# Patient Record
Sex: FEMALE | Race: WHITE | NOT HISPANIC OR LATINO | Employment: FULL TIME | URBAN - METROPOLITAN AREA
[De-identification: names, ages, dates, MRNs, and addresses within clinical notes are randomized per-mention and may not be internally consistent; named-entity substitution may affect disease eponyms.]

---

## 2023-09-05 PROBLEM — E55.9 VITAMIN D DEFICIENCY: Status: ACTIVE | Noted: 2023-09-05

## 2023-09-05 PROBLEM — F41.9 ANXIETY: Status: ACTIVE | Noted: 2023-09-05

## 2023-09-05 PROBLEM — Z86.39 HISTORY OF HYPERGLYCEMIA: Status: ACTIVE | Noted: 2023-09-05

## 2023-09-05 PROBLEM — E11.9 TYPE 2 DIABETES MELLITUS (MULTI): Status: ACTIVE | Noted: 2023-09-05

## 2023-09-05 PROBLEM — E78.5 HYPERLIPIDEMIA: Status: ACTIVE | Noted: 2023-09-05

## 2023-09-05 PROBLEM — E05.90 HYPERTHYROIDISM: Status: ACTIVE | Noted: 2023-09-05

## 2023-09-05 RX ORDER — ZINC GLUCONATE 100 MG
1 TABLET ORAL DAILY
COMMUNITY

## 2023-09-05 RX ORDER — ASCORBIC ACID 500 MG
1 TABLET ORAL DAILY
COMMUNITY

## 2023-09-05 RX ORDER — MULTIVITAMIN/IRON/FOLIC ACID 18MG-0.4MG
TABLET ORAL
COMMUNITY

## 2023-09-05 RX ORDER — ACETAMINOPHEN 500 MG
1 TABLET ORAL DAILY
COMMUNITY

## 2023-12-28 ENCOUNTER — APPOINTMENT (OUTPATIENT)
Dept: PRIMARY CARE | Facility: CLINIC | Age: 48
End: 2023-12-28
Payer: COMMERCIAL

## 2024-03-08 ENCOUNTER — LAB (OUTPATIENT)
Dept: LAB | Facility: LAB | Age: 49
End: 2024-03-08
Payer: COMMERCIAL

## 2024-03-08 DIAGNOSIS — E05.90 THYROTOXICOSIS, UNSPECIFIED WITHOUT THYROTOXIC CRISIS OR STORM: Primary | ICD-10-CM

## 2024-03-08 DIAGNOSIS — E78.5 HYPERLIPIDEMIA, UNSPECIFIED: ICD-10-CM

## 2024-03-08 DIAGNOSIS — E11.9 TYPE 2 DIABETES MELLITUS WITHOUT COMPLICATIONS (MULTI): ICD-10-CM

## 2024-03-08 LAB
ALBUMIN SERPL-MCNC: 4.1 G/DL (ref 3.5–5)
ALP BLD-CCNC: 77 U/L (ref 35–125)
ALT SERPL-CCNC: 34 U/L (ref 5–40)
ANION GAP SERPL CALC-SCNC: 18 MMOL/L
AST SERPL-CCNC: 33 U/L (ref 5–40)
BILIRUB SERPL-MCNC: 0.5 MG/DL (ref 0.1–1.2)
BUN SERPL-MCNC: 12 MG/DL (ref 8–25)
CALCIUM SERPL-MCNC: 9.6 MG/DL (ref 8.5–10.4)
CHLORIDE SERPL-SCNC: 101 MMOL/L (ref 97–107)
CHOLEST SERPL-MCNC: 170 MG/DL (ref 133–200)
CHOLEST/HDLC SERPL: 2.5 {RATIO}
CO2 SERPL-SCNC: 21 MMOL/L (ref 24–31)
CREAT SERPL-MCNC: 0.5 MG/DL (ref 0.4–1.6)
EGFRCR SERPLBLD CKD-EPI 2021: >90 ML/MIN/1.73M*2
EST. AVERAGE GLUCOSE BLD GHB EST-MCNC: 183 MG/DL
GLUCOSE SERPL-MCNC: 98 MG/DL (ref 65–99)
HBA1C MFR BLD: 8 %
HDLC SERPL-MCNC: 68 MG/DL
LDLC SERPL CALC-MCNC: 90 MG/DL (ref 65–130)
POTASSIUM SERPL-SCNC: 4.3 MMOL/L (ref 3.4–5.1)
PROT SERPL-MCNC: 6.7 G/DL (ref 5.9–7.9)
SODIUM SERPL-SCNC: 140 MMOL/L (ref 133–145)
T4 FREE SERPL-MCNC: 3.5 NG/DL (ref 0.9–1.7)
TRIGL SERPL-MCNC: 60 MG/DL (ref 40–150)
TSH SERPL DL<=0.05 MIU/L-ACNC: <0.01 MIU/L (ref 0.27–4.2)

## 2024-03-08 PROCEDURE — 83036 HEMOGLOBIN GLYCOSYLATED A1C: CPT

## 2024-03-08 PROCEDURE — 36415 COLL VENOUS BLD VENIPUNCTURE: CPT

## 2024-03-08 PROCEDURE — 84439 ASSAY OF FREE THYROXINE: CPT

## 2024-03-08 PROCEDURE — 80061 LIPID PANEL: CPT

## 2024-03-08 PROCEDURE — 84443 ASSAY THYROID STIM HORMONE: CPT

## 2024-03-08 PROCEDURE — 80053 COMPREHEN METABOLIC PANEL: CPT

## 2024-03-13 ENCOUNTER — OFFICE VISIT (OUTPATIENT)
Dept: PRIMARY CARE | Facility: CLINIC | Age: 49
End: 2024-03-13
Payer: COMMERCIAL

## 2024-03-13 VITALS
DIASTOLIC BLOOD PRESSURE: 74 MMHG | OXYGEN SATURATION: 99 % | SYSTOLIC BLOOD PRESSURE: 126 MMHG | TEMPERATURE: 97.3 F | HEART RATE: 77 BPM | HEIGHT: 60 IN | BODY MASS INDEX: 28.27 KG/M2 | WEIGHT: 144 LBS

## 2024-03-13 DIAGNOSIS — Z01.89 ENCOUNTER FOR ROUTINE LABORATORY TESTING: ICD-10-CM

## 2024-03-13 DIAGNOSIS — L30.9 ECZEMA OF BOTH HANDS: ICD-10-CM

## 2024-03-13 DIAGNOSIS — E05.90 HYPERTHYROIDISM: ICD-10-CM

## 2024-03-13 DIAGNOSIS — Z11.59 NEED FOR HEPATITIS C SCREENING TEST: ICD-10-CM

## 2024-03-13 DIAGNOSIS — Z12.31 ENCOUNTER FOR SCREENING MAMMOGRAM FOR BREAST CANCER: ICD-10-CM

## 2024-03-13 DIAGNOSIS — Z00.00 ANNUAL PHYSICAL EXAM: Primary | ICD-10-CM

## 2024-03-13 DIAGNOSIS — E55.9 VITAMIN D DEFICIENCY: ICD-10-CM

## 2024-03-13 DIAGNOSIS — E11.9 TYPE 2 DIABETES MELLITUS WITHOUT COMPLICATION, WITHOUT LONG-TERM CURRENT USE OF INSULIN (MULTI): ICD-10-CM

## 2024-03-13 PROCEDURE — 3074F SYST BP LT 130 MM HG: CPT | Performed by: NURSE PRACTITIONER

## 2024-03-13 PROCEDURE — 3052F HG A1C>EQUAL 8.0%<EQUAL 9.0%: CPT | Performed by: NURSE PRACTITIONER

## 2024-03-13 PROCEDURE — 1036F TOBACCO NON-USER: CPT | Performed by: NURSE PRACTITIONER

## 2024-03-13 PROCEDURE — 99396 PREV VISIT EST AGE 40-64: CPT | Performed by: NURSE PRACTITIONER

## 2024-03-13 PROCEDURE — 3048F LDL-C <100 MG/DL: CPT | Performed by: NURSE PRACTITIONER

## 2024-03-13 PROCEDURE — 3078F DIAST BP <80 MM HG: CPT | Performed by: NURSE PRACTITIONER

## 2024-03-13 RX ORDER — METFORMIN HYDROCHLORIDE 500 MG/1
500 TABLET ORAL
Qty: 180 TABLET | Refills: 3 | Status: SHIPPED | OUTPATIENT
Start: 2024-03-13

## 2024-03-13 RX ORDER — BLOOD-GLUCOSE SENSOR
EACH MISCELLANEOUS
Qty: 2 EACH | Refills: 11 | Status: SHIPPED | OUTPATIENT
Start: 2024-03-13

## 2024-03-13 RX ORDER — TRIAMCINOLONE ACETONIDE 1 MG/G
OINTMENT TOPICAL 2 TIMES DAILY PRN
Qty: 15 G | Refills: 1 | Status: SHIPPED | OUTPATIENT
Start: 2024-03-13 | End: 2024-07-11

## 2024-03-13 ASSESSMENT — LIFESTYLE VARIABLES
HOW OFTEN DURING THE LAST YEAR HAVE YOU FAILED TO DO WHAT WAS NORMALLY EXPECTED FROM YOU BECAUSE OF DRINKING: NEVER
HOW MANY STANDARD DRINKS CONTAINING ALCOHOL DO YOU HAVE ON A TYPICAL DAY: PATIENT DOES NOT DRINK
HAS A RELATIVE, FRIEND, DOCTOR, OR ANOTHER HEALTH PROFESSIONAL EXPRESSED CONCERN ABOUT YOUR DRINKING OR SUGGESTED YOU CUT DOWN: NO
HOW OFTEN DURING THE LAST YEAR HAVE YOU BEEN UNABLE TO REMEMBER WHAT HAPPENED THE NIGHT BEFORE BECAUSE YOU HAD BEEN DRINKING: NEVER
AUDIT-C TOTAL SCORE: 0
HOW OFTEN DURING THE LAST YEAR HAVE YOU HAD A FEELING OF GUILT OR REMORSE AFTER DRINKING: NEVER
HOW OFTEN DO YOU HAVE A DRINK CONTAINING ALCOHOL: NEVER
HAVE YOU OR SOMEONE ELSE BEEN INJURED AS A RESULT OF YOUR DRINKING: NO
HOW OFTEN DURING THE LAST YEAR HAVE YOU FOUND THAT YOU WERE NOT ABLE TO STOP DRINKING ONCE YOU HAD STARTED: NEVER
AUDIT TOTAL SCORE: 0
SKIP TO QUESTIONS 9-10: 1
HOW OFTEN DO YOU HAVE SIX OR MORE DRINKS ON ONE OCCASION: NEVER
HOW OFTEN DURING THE LAST YEAR HAVE YOU NEEDED AN ALCOHOLIC DRINK FIRST THING IN THE MORNING TO GET YOURSELF GOING AFTER A NIGHT OF HEAVY DRINKING: NEVER

## 2024-03-13 ASSESSMENT — ENCOUNTER SYMPTOMS
POLYPHAGIA: 0
BACK PAIN: 0
NAUSEA: 0
HEMATURIA: 0
OCCASIONAL FEELINGS OF UNSTEADINESS: 0
FREQUENCY: 0
POLYDIPSIA: 0
ADENOPATHY: 0
BRUISES/BLEEDS EASILY: 0
SPEECH DIFFICULTY: 0
FEVER: 0
BLOOD IN STOOL: 0
PALPITATIONS: 0
AGITATION: 0
FATIGUE: 0
APPETITE CHANGE: 0
CHILLS: 0
SEIZURES: 0
LOSS OF SENSATION IN FEET: 0
WOUND: 0
ABDOMINAL PAIN: 0
CHEST TIGHTNESS: 0
FACIAL ASYMMETRY: 0
CONFUSION: 0
COUGH: 0
DEPRESSION: 0
VOMITING: 0
DIZZINESS: 0
DYSURIA: 0
DIAPHORESIS: 0
HEADACHES: 0
SHORTNESS OF BREATH: 0
NECK PAIN: 0

## 2024-03-13 ASSESSMENT — PAIN SCALES - GENERAL: PAINLEVEL: 0-NO PAIN

## 2024-03-13 ASSESSMENT — PROMIS GLOBAL HEALTH SCALE
RATE_MENTAL_HEALTH: VERY GOOD
CARRYOUT_PHYSICAL_ACTIVITIES: COMPLETELY
RATE_SOCIAL_SATISFACTION: VERY GOOD
RATE_AVERAGE_PAIN: 0
RATE_PHYSICAL_HEALTH: VERY GOOD
RATE_QUALITY_OF_LIFE: VERY GOOD
RATE_GENERAL_HEALTH: VERY GOOD
CARRYOUT_SOCIAL_ACTIVITIES: VERY GOOD
EMOTIONAL_PROBLEMS: RARELY

## 2024-03-13 ASSESSMENT — PATIENT HEALTH QUESTIONNAIRE - PHQ9
SUM OF ALL RESPONSES TO PHQ9 QUESTIONS 1 AND 2: 0
1. LITTLE INTEREST OR PLEASURE IN DOING THINGS: NOT AT ALL
2. FEELING DOWN, DEPRESSED OR HOPELESS: NOT AT ALL

## 2024-03-13 NOTE — PROGRESS NOTES
[facility]: [facility]  PHYSICAL EXAM      Josi Nicole is a 49 y.o. female that is presenting today for CPE. Newly diagnosed DMII.  Complaint of very dry skin to fingertips on both hands, cracking and bleeding.    Referred to Endocrinology for Hyperthyroidism.    Assessment/Plan   Diagnoses and all orders for this visit:    Annual physical exam    Vitamin D deficiency        -     Vitamin D; Future        -     Continue daily OTC Vitamin D supplement    Hyperthyroidism  -     Referral to Endocrinology; Future    Need for hepatitis C screening test  -     Hepatitis C antibody; Future    Encounter for screening mammogram for breast cancer  -     BI mammo bilateral screening tomosynthesis; Future    Type 2 diabetes mellitus without complication, without long-term current use of insulin (CMS/HCC)  -     Newly diagnosed, start meds and obtain annual labs  -     Start metFORMIN (Glucophage) 500 mg tablet; Take 1 tablet (500 mg) by mouth 2 times a day with meals.  -     Referral to diabetic educator  -     Continue FreeStyle Olivia 3 Sensor device; Apply every 2 weeks  -     Albumin , Urine Random; Future  -     Hemoglobin A1C; Future  -     Basic Metabolic Panel; Future  -     Follow up in 3 months to assess status    Eczema of both hands  -     Start triamcinolone (Kenalog) 0.1 % ointment; Apply topically 2 times a day as needed for irritation or rash.    Encounter for routine laboratory testing  -     Albumin , Urine Random; Future  -     Hemoglobin A1C; Future  -     Basic Metabolic Panel; Future    Other orders  -     Follow Up In Primary Care - Established; Future    Subjective   Subjective  Josi Nicole is an 49 y.o. female who presents for follow up of diabetes. Current symptoms include: none. Patient denies foot ulcerations, hyperglycemia, hypoglycemia , increased appetite, nausea, paresthesia of the feet, polydipsia, polyuria, visual disturbances, vomiting, and weight loss.  Evaluation to date has included: fasting blood sugar, fasting lipid panel, and hemoglobin A1C. Home sugars: BGs are running  consistent with Hgb A1C. Current treatments: none. Last dilated eye exam - not done    [unfilled]     Objective  [unfilled]     Laboratory:  Lab Results       Component                Value               Date                       HGBA1C                   8.0 (H)             03/08/2024              Assessment/Plan  Diabetes mellitus Type II, under poor control.  Addressed ADA diet.  Encouraged aerobic exercise.  Discussed foot care.  Reminded to get yearly retinal exam.  Started metformin; see  medication orders.  Diabetes educator referral.  Endocrinology clinic referral.  Follow up in 3 months or as needed.        Review of Systems   Constitutional:  Negative for appetite change, chills, diaphoresis, fatigue and fever.   HENT:  Negative for hearing loss and mouth sores.    Eyes:  Negative for visual disturbance.   Respiratory:  Negative for cough, chest tightness and shortness of breath.    Cardiovascular:  Negative for chest pain, palpitations and leg swelling.   Gastrointestinal:  Negative for abdominal pain, blood in stool, nausea and vomiting.   Endocrine: Negative for cold intolerance, heat intolerance, polydipsia, polyphagia and polyuria.   Genitourinary:  Negative for dysuria, frequency and hematuria.   Musculoskeletal:  Negative for back pain and neck pain.   Skin:  Positive for rash (bilateral fingers - dry, cracking, bleeding). Negative for wound.   Allergic/Immunologic: Negative for environmental allergies, food allergies and immunocompromised state.   Neurological:  Negative for dizziness, seizures, syncope, facial asymmetry, speech difficulty and headaches.   Hematological:  Negative for adenopathy. Does not bruise/bleed easily.   Psychiatric/Behavioral:  Negative for agitation and confusion.       Objective   Vitals:    03/13/24 0945   BP: 126/74   Pulse: 77   Temp: 36.3 °C  (97.3 °F)   SpO2: 99%     Body mass index is 28.12 kg/m².  Physical Exam  Vitals and nursing note reviewed.   Constitutional:       General: She is not in acute distress.     Appearance: Normal appearance. She is not ill-appearing.   HENT:      Head: Normocephalic and atraumatic.      Right Ear: Tympanic membrane, ear canal and external ear normal. There is no impacted cerumen.      Left Ear: Tympanic membrane, ear canal and external ear normal. There is no impacted cerumen.      Nose: Nose normal.      Mouth/Throat:      Mouth: Mucous membranes are moist.      Pharynx: Oropharynx is clear. No oropharyngeal exudate or posterior oropharyngeal erythema.   Eyes:      General: No scleral icterus.        Right eye: No discharge.         Left eye: No discharge.      Extraocular Movements: Extraocular movements intact.      Conjunctiva/sclera: Conjunctivae normal.      Pupils: Pupils are equal, round, and reactive to light.   Neck:      Vascular: No carotid bruit.   Cardiovascular:      Rate and Rhythm: Normal rate and regular rhythm.      Pulses: Normal pulses.           Dorsalis pedis pulses are 2+ on the right side and 2+ on the left side.      Heart sounds: Normal heart sounds. No murmur heard.  Pulmonary:      Effort: Pulmonary effort is normal. No respiratory distress.      Breath sounds: Normal breath sounds.   Abdominal:      General: Abdomen is flat. Bowel sounds are normal. There is no distension.      Palpations: Abdomen is soft. There is no mass.      Tenderness: There is no abdominal tenderness. There is no right CVA tenderness or left CVA tenderness.      Hernia: No hernia is present.   Musculoskeletal:         General: No tenderness. Normal range of motion.      Cervical back: No tenderness.      Right lower leg: No edema.      Left lower leg: No edema.      Right foot: Normal range of motion. No deformity, bunion, Charcot foot, foot drop or prominent metatarsal heads.      Left foot: Normal range of motion.  "No deformity, bunion, Charcot foot, foot drop or prominent metatarsal heads.   Feet:      Right foot:      Protective Sensation: 7 sites tested.  7 sites sensed.      Skin integrity: Skin integrity normal.      Toenail Condition: Right toenails are normal.      Left foot:      Protective Sensation: 7 sites tested.  7 sites sensed.      Skin integrity: Skin integrity normal.      Toenail Condition: Left toenails are normal.      Comments: Normal monofilament bilaterally  Lymphadenopathy:      Cervical: No cervical adenopathy.   Skin:     General: Skin is warm and dry.      Coloration: Skin is not jaundiced.      Findings: Rash (dry, cracking skin to tips of fingers on bilateral hands without signs of bacterial infection) present.   Neurological:      General: No focal deficit present.      Mental Status: She is alert and oriented to person, place, and time. Mental status is at baseline.   Psychiatric:         Mood and Affect: Mood normal.         Behavior: Behavior normal.       Diagnostic Results   Lab Results   Component Value Date    GLUCOSE 98 03/08/2024    CALCIUM 9.6 03/08/2024     03/08/2024    K 4.3 03/08/2024    CO2 21 (L) 03/08/2024     03/08/2024    BUN 12 03/08/2024    CREATININE 0.50 03/08/2024     Lab Results   Component Value Date    ALT 34 03/08/2024    AST 33 03/08/2024    ALKPHOS 77 03/08/2024    BILITOT 0.5 03/08/2024     Lab Results   Component Value Date    WBC 6.8 03/08/2023    HGB 13.6 03/08/2023    HCT 41.1 03/08/2023    MCV 89.0 03/08/2023     03/08/2023     Lab Results   Component Value Date    CHOL 170 03/08/2024    CHOL 172 03/08/2023     Lab Results   Component Value Date    HDL 68.0 03/08/2024    HDL 64 03/08/2023     Lab Results   Component Value Date    LDLCALC 90 03/08/2024    LDLCALC 98 03/08/2023     Lab Results   Component Value Date    TRIG 60 03/08/2024    TRIG 51 03/08/2023     No components found for: \"CHOLHDL\"  Lab Results   Component Value Date    HGBA1C " 8.0 (H) 03/08/2024     Other labs not included in the list above were reviewed either before or during this encounter.    History   Past Medical History:   Diagnosis Date    Diabetes mellitus (CMS/HCC)      History reviewed. No pertinent surgical history.  Family History   Problem Relation Name Age of Onset    Hypertension Father Dad     Parkinsonism Father Dad     Stroke Father Dad      Social History     Socioeconomic History    Marital status:      Spouse name: Not on file    Number of children: Not on file    Years of education: Not on file    Highest education level: Not on file   Occupational History    Not on file   Tobacco Use    Smoking status: Never    Smokeless tobacco: Never   Substance and Sexual Activity    Alcohol use: Not Currently     Alcohol/week: 1.0 standard drink of alcohol     Types: 1 Standard drinks or equivalent per week    Drug use: Never    Sexual activity: Yes     Partners: Male     Birth control/protection: None   Other Topics Concern    Not on file   Social History Narrative    Not on file     Social Determinants of Health     Financial Resource Strain: Not on file   Food Insecurity: Not on file   Transportation Needs: Not on file   Physical Activity: Not on file   Stress: Not on file   Social Connections: Not on file   Intimate Partner Violence: Not on file   Housing Stability: Not on file     No Known Allergies  Current Outpatient Medications on File Prior to Visit   Medication Sig Dispense Refill    ascorbic acid (Vitamin C) 500 mg tablet Take 1 tablet (500 mg) by mouth once daily.      b complex 0.4 mg tablet Take by mouth.      blood sugar diagnostic strip once daily.      cholecalciferol (Vitamin D3) 50 mcg (2,000 unit) capsule Take 1 capsule (50 mcg) by mouth early in the morning..      L. acidophilus/Bifid. animalis (DAILY PROBIOTIC ORAL) Take by mouth.      multivit-min/iron/folic acid/K (ADULTS MULTIVITAMIN ORAL) Take 1 tablet by mouth once daily.      zinc gluconate  100 mg tablet Take 1 tablet (100 mg) by mouth once daily.       No current facility-administered medications on file prior to visit.     Immunization History   Administered Date(s) Administered    Moderna SARS-CoV-2 Vaccination 08/09/2021, 09/13/2021     Patient's medical history was reviewed and updated either before or during this encounter.       Asha Centeno, APRN-CNP

## 2024-03-15 ENCOUNTER — HOSPITAL ENCOUNTER (OUTPATIENT)
Dept: RADIOLOGY | Facility: CLINIC | Age: 49
Discharge: HOME | End: 2024-03-15
Payer: COMMERCIAL

## 2024-03-15 ENCOUNTER — TELEPHONE (OUTPATIENT)
Dept: CARE COORDINATION | Facility: CLINIC | Age: 49
End: 2024-03-15
Payer: COMMERCIAL

## 2024-03-15 VITALS — BODY MASS INDEX: 27.48 KG/M2 | WEIGHT: 140 LBS | HEIGHT: 60 IN

## 2024-03-15 DIAGNOSIS — Z12.31 ENCOUNTER FOR SCREENING MAMMOGRAM FOR BREAST CANCER: ICD-10-CM

## 2024-03-15 PROCEDURE — 77067 SCR MAMMO BI INCL CAD: CPT

## 2024-03-15 NOTE — PROGRESS NOTES
Spoke with patient in regard to setting up an appointment for initial diabetes education, however, patient declined services, at this time, indicating that she had a nutrition background and felt she had a good enough understanding of the disease.

## 2024-06-11 ENCOUNTER — TELEPHONE (OUTPATIENT)
Dept: ENDOCRINOLOGY | Facility: CLINIC | Age: 49
End: 2024-06-11
Payer: COMMERCIAL

## 2024-06-11 NOTE — PROGRESS NOTES
HPI   50 yo presents as new pt for thyroid.  Pt with dm2 (3/ 2024:A1c-8%).  -had gi upset on metformin 500mg ir   -10 days of olivia 3 data 81% in range, 4%low (likely compression lows)    Thyroid:  TSH-<0.01, ft4-3.5 march 2024,   -in 2023 tsh 0.01-0.02, ft4-2-3.1 range    Sx:  -no obstructive sx  -no eye sx  -on apple watch hr 90-mid 100's  -more heat sensitive  -otherwise no other sx  -lost 35 lbs 3/2023 until summer 2023 with fasting, wt coming back        Medical History        Past Medical History:   Diagnosis Date    Diabetes mellitus (CMS/HCC)           Surgical History   History reviewed. No pertinent surgical history.     Family History          Family History   Problem Relation Name Age of Onset    Hypertension Father Dad      Parkinsonism Father Dad      Stroke Father Dad         maternal aunt/gm-dm2  Mom-ifg    Social History       -tobacco, - alcohol       Current Outpatient Medications:     ascorbic acid (Vitamin C) 500 mg tablet, Take 1 tablet (500 mg) by mouth once daily., Disp: , Rfl:     b complex 0.4 mg tablet, Take by mouth., Disp: , Rfl:     cholecalciferol (Vitamin D3) 50 mcg (2,000 unit) capsule, Take 1 capsule (50 mcg) by mouth early in the morning.., Disp: , Rfl:     FreeStyle Olivia 3 Sensor device, Apply every 2 weeks, Disp: 2 each, Rfl: 11    L. acidophilus/Bifid. animalis (DAILY PROBIOTIC ORAL), Take by mouth., Disp: , Rfl:     multivit-min/iron/folic acid/K (ADULTS MULTIVITAMIN ORAL), Take 1 tablet by mouth once daily., Disp: , Rfl:     zinc gluconate 100 mg tablet, Take 1 tablet (100 mg) by mouth once daily., Disp: , Rfl:     metFORMIN (Glucophage) 500 mg tablet, Take 1 tablet (500 mg) by mouth 2 times a day with meals. (Patient not taking: Reported on 6/12/2024), Disp: 180 tablet, Rfl: 3    semaglutide (Ozempic) 0.25 mg or 0.5 mg (2 mg/3 mL) pen injector, Inject 0.5 mg under the skin every 7 days., Disp: 3 mL, Rfl: 5    triamcinolone (Kenalog) 0.1 % ointment, Apply topically 2 times a  "day as needed for irritation or rash. (Patient not taking: Reported on 6/12/2024), Disp: 15 g, Rfl: 1      Allergies as of 06/12/2024    (No Known Allergies)         Review of Systems   Cardiology: Lightheadedness-denies.  Chest pain-denies.  Leg edema-denies.  Palpitations-occ.  Respiratory: Cough-denies. Shortness of breath-denies.  Wheezing-denies.  Gastroenterology: Constipation-denies.  Diarrhea-denies.  Heartburn-denies.  Endocrinology: Cold intolerance-denies.  Heat intolerance +.  Sweats-denies.  Neurology: Headache-denies.  Tremor-denies.  Neuropathy in extremities-denies.  Psychology: Low energy-denies.  Irritability-denies.  Sleep disturbances-denies.      /62   Pulse 90   Ht 1.524 m (5')   Wt 67.2 kg (148 lb 3.2 oz)   BMI 28.94 kg/m²       Labs:  Lab Results   Component Value Date    WBC 6.8 03/08/2023    NRBC 0 03/08/2023    RBC 4.62 03/08/2023    HGB 13.6 03/08/2023    HCT 41.1 03/08/2023     03/08/2023     Lab Results   Component Value Date    CALCIUM 9.6 03/08/2024    AST 33 03/08/2024    ALKPHOS 77 03/08/2024    BILITOT 0.5 03/08/2024    PROT 6.7 03/08/2024    ALBUMIN 4.1 03/08/2024    GLOB 2.5 03/08/2023    AGR 1.7 03/08/2023     03/08/2024    K 4.3 03/08/2024     03/08/2024    CO2 21 (L) 03/08/2024    ANIONGAP 18 03/08/2024    BUN 12 03/08/2024    CREATININE 0.50 03/08/2024    UREACREAUR 32.5 (H) 06/16/2023    GLUCOSE 98 03/08/2024    ALT 34 03/08/2024    EGFR >90 03/08/2024     Lab Results   Component Value Date    CHOL 170 03/08/2024    TRIG 60 03/08/2024    HDL 68.0 03/08/2024    LDLCALC 90 03/08/2024     No results found for: \"MICROALBCREA\"  Lab Results   Component Value Date    TSH <0.01 (L) 03/08/2024     No results found for: \"WANTIICG63\"  Lab Results   Component Value Date    HGBA1C 8.0 (H) 03/08/2024         Physical Exam   General Appearance: pleasant, cooperative, no acute distress  HEENT: no chemosis, no proptosis, no lid lag, no lid retraction  Neck: no " lymphadenopathy, thyroid about 2X normal size  Heart: no murmurs, regular rate and rhythm, S1 and S2  Lungs: no wheezes, no rhonci, no rales  Extremities: no lower extremity swelling      Assessment/Plan   1. Hyperthyroidism  -suspect Graves' disease based on thyroid exam and duration of thyroid abnormalities on lab review  -check full labs/ab levels    -went over natural hx of Graves and discussed methimazole/I 131/surgery  -pt prefers methimazole, reviewed risk/benefits/warnings of this med  -can send rx and repeat lab orders once we have initial labs back    2. Type 2 diabetes mellitus without complication, without long-term current use of insulin (Multi)  -hyperthyroid state can acutely raise blood sugars  -based on FH and previous tests consistent with mild dm2  -pt did not tolerate metformin  -pt very concerned about wt gain with the above thyroid condition    -add ozempic 0.25mg weekly for 4 weeks, then 0.5mg weekly thereafter  -reviewed risk/benefits/warnings, taught pt how to inject this    Follow Up:  Ana 3 months    -labs/tests/notes reviewed  -reviewed and counseled patient on medication monitoring and side effects

## 2024-06-12 ENCOUNTER — APPOINTMENT (OUTPATIENT)
Dept: PRIMARY CARE | Facility: CLINIC | Age: 49
End: 2024-06-12
Payer: COMMERCIAL

## 2024-06-12 ENCOUNTER — APPOINTMENT (OUTPATIENT)
Dept: ENDOCRINOLOGY | Facility: CLINIC | Age: 49
End: 2024-06-12
Payer: COMMERCIAL

## 2024-06-12 ENCOUNTER — LAB (OUTPATIENT)
Dept: LAB | Facility: LAB | Age: 49
End: 2024-06-12
Payer: COMMERCIAL

## 2024-06-12 VITALS
DIASTOLIC BLOOD PRESSURE: 62 MMHG | BODY MASS INDEX: 29.09 KG/M2 | WEIGHT: 148.2 LBS | HEIGHT: 60 IN | HEART RATE: 90 BPM | SYSTOLIC BLOOD PRESSURE: 144 MMHG

## 2024-06-12 DIAGNOSIS — E11.9 TYPE 2 DIABETES MELLITUS WITHOUT COMPLICATION, WITHOUT LONG-TERM CURRENT USE OF INSULIN (MULTI): Primary | ICD-10-CM

## 2024-06-12 DIAGNOSIS — E05.90 HYPERTHYROIDISM: ICD-10-CM

## 2024-06-12 LAB
T3FREE SERPL-MCNC: 8.5 PG/ML (ref 2.3–4.2)
T4 FREE SERPL-MCNC: 2.9 NG/DL (ref 0.9–1.7)
THYROPEROXIDASE AB SERPL-ACNC: >1000 IU/ML
TSH SERPL DL<=0.05 MIU/L-ACNC: <0.01 MIU/L (ref 0.27–4.2)

## 2024-06-12 PROCEDURE — 3078F DIAST BP <80 MM HG: CPT | Performed by: INTERNAL MEDICINE

## 2024-06-12 PROCEDURE — 84443 ASSAY THYROID STIM HORMONE: CPT

## 2024-06-12 PROCEDURE — RXMED WILLOW AMBULATORY MEDICATION CHARGE

## 2024-06-12 PROCEDURE — 84439 ASSAY OF FREE THYROXINE: CPT

## 2024-06-12 PROCEDURE — 84445 ASSAY OF TSI GLOBULIN: CPT

## 2024-06-12 PROCEDURE — 36415 COLL VENOUS BLD VENIPUNCTURE: CPT

## 2024-06-12 PROCEDURE — 84481 FREE ASSAY (FT-3): CPT

## 2024-06-12 PROCEDURE — 3048F LDL-C <100 MG/DL: CPT | Performed by: INTERNAL MEDICINE

## 2024-06-12 PROCEDURE — 1036F TOBACCO NON-USER: CPT | Performed by: INTERNAL MEDICINE

## 2024-06-12 PROCEDURE — 99204 OFFICE O/P NEW MOD 45 MIN: CPT | Performed by: INTERNAL MEDICINE

## 2024-06-12 PROCEDURE — 83519 RIA NONANTIBODY: CPT

## 2024-06-12 PROCEDURE — 3077F SYST BP >= 140 MM HG: CPT | Performed by: INTERNAL MEDICINE

## 2024-06-12 PROCEDURE — 3052F HG A1C>EQUAL 8.0%<EQUAL 9.0%: CPT | Performed by: INTERNAL MEDICINE

## 2024-06-12 PROCEDURE — 86376 MICROSOMAL ANTIBODY EACH: CPT

## 2024-06-12 RX ORDER — SEMAGLUTIDE 0.68 MG/ML
0.5 INJECTION, SOLUTION SUBCUTANEOUS
Qty: 3 ML | Refills: 5 | Status: SHIPPED | OUTPATIENT
Start: 2024-06-12

## 2024-06-12 ASSESSMENT — PATIENT HEALTH QUESTIONNAIRE - PHQ9
1. LITTLE INTEREST OR PLEASURE IN DOING THINGS: NOT AT ALL
SUM OF ALL RESPONSES TO PHQ9 QUESTIONS 1 & 2: 0
2. FEELING DOWN, DEPRESSED OR HOPELESS: NOT AT ALL

## 2024-06-12 ASSESSMENT — ENCOUNTER SYMPTOMS
LOSS OF SENSATION IN FEET: 0
OCCASIONAL FEELINGS OF UNSTEADINESS: 0
DEPRESSION: 0

## 2024-06-12 ASSESSMENT — PAIN SCALES - GENERAL: PAINLEVEL: 0-NO PAIN

## 2024-06-13 ENCOUNTER — PHARMACY VISIT (OUTPATIENT)
Dept: PHARMACY | Facility: CLINIC | Age: 49
End: 2024-06-13
Payer: COMMERCIAL

## 2024-06-14 LAB — TSH RECEP AB SER-ACNC: 6.43 IU/L

## 2024-06-17 DIAGNOSIS — E05.90 HYPERTHYROIDISM: Primary | ICD-10-CM

## 2024-06-17 RX ORDER — METHIMAZOLE 10 MG/1
10 TABLET ORAL 3 TIMES DAILY
Qty: 30 TABLET | Refills: 3 | Status: SHIPPED | OUTPATIENT
Start: 2024-06-17 | End: 2024-07-17

## 2024-06-18 DIAGNOSIS — E11.9 TYPE 2 DIABETES MELLITUS WITHOUT COMPLICATION, WITHOUT LONG-TERM CURRENT USE OF INSULIN (MULTI): Primary | ICD-10-CM

## 2024-06-18 RX ORDER — ONDANSETRON 4 MG/1
4 TABLET, FILM COATED ORAL EVERY 8 HOURS PRN
Qty: 20 TABLET | Refills: 1 | Status: SHIPPED | OUTPATIENT
Start: 2024-06-18 | End: 2024-06-25

## 2024-06-20 LAB — TSI SER-ACNC: <1 TSI INDEX

## 2024-07-04 PROCEDURE — RXMED WILLOW AMBULATORY MEDICATION CHARGE

## 2024-07-05 ENCOUNTER — PHARMACY VISIT (OUTPATIENT)
Dept: PHARMACY | Facility: CLINIC | Age: 49
End: 2024-07-05
Payer: COMMERCIAL

## 2024-07-25 ENCOUNTER — TELEPHONE (OUTPATIENT)
Dept: PRIMARY CARE | Facility: CLINIC | Age: 49
End: 2024-07-25
Payer: COMMERCIAL

## 2024-08-01 PROCEDURE — RXMED WILLOW AMBULATORY MEDICATION CHARGE

## 2024-08-02 ENCOUNTER — PHARMACY VISIT (OUTPATIENT)
Dept: PHARMACY | Facility: CLINIC | Age: 49
End: 2024-08-02
Payer: COMMERCIAL

## 2024-08-06 ENCOUNTER — PATIENT MESSAGE (OUTPATIENT)
Dept: ENDOCRINOLOGY | Facility: CLINIC | Age: 49
End: 2024-08-06
Payer: COMMERCIAL

## 2024-08-06 DIAGNOSIS — E11.9 TYPE 2 DIABETES MELLITUS WITHOUT COMPLICATION, WITHOUT LONG-TERM CURRENT USE OF INSULIN (MULTI): Primary | ICD-10-CM

## 2024-08-09 DIAGNOSIS — E05.90 HYPERTHYROIDISM: ICD-10-CM

## 2024-08-09 PROCEDURE — RXMED WILLOW AMBULATORY MEDICATION CHARGE

## 2024-08-09 RX ORDER — SEMAGLUTIDE 1.34 MG/ML
1 INJECTION, SOLUTION SUBCUTANEOUS
Qty: 3 ML | Refills: 5 | Status: SHIPPED | OUTPATIENT
Start: 2024-08-11

## 2024-08-09 RX ORDER — METHIMAZOLE 10 MG/1
TABLET ORAL
Qty: 30 TABLET | Refills: 2 | Status: SHIPPED | OUTPATIENT
Start: 2024-08-09

## 2024-08-17 ENCOUNTER — PHARMACY VISIT (OUTPATIENT)
Dept: PHARMACY | Facility: CLINIC | Age: 49
End: 2024-08-17
Payer: COMMERCIAL

## 2024-09-08 PROCEDURE — RXMED WILLOW AMBULATORY MEDICATION CHARGE

## 2024-09-12 ENCOUNTER — PHARMACY VISIT (OUTPATIENT)
Dept: PHARMACY | Facility: CLINIC | Age: 49
End: 2024-09-12
Payer: COMMERCIAL

## 2024-09-13 ENCOUNTER — LAB (OUTPATIENT)
Dept: LAB | Facility: LAB | Age: 49
End: 2024-09-13
Payer: COMMERCIAL

## 2024-09-13 DIAGNOSIS — E05.90 HYPERTHYROIDISM: ICD-10-CM

## 2024-09-13 LAB
T3FREE SERPL-MCNC: 3.2 PG/ML (ref 2.3–4.2)
T4 FREE SERPL-MCNC: 1.3 NG/DL (ref 0.9–1.7)
TSH SERPL DL<=0.05 MIU/L-ACNC: 0.01 MIU/L (ref 0.27–4.2)

## 2024-09-13 PROCEDURE — 84439 ASSAY OF FREE THYROXINE: CPT

## 2024-09-13 PROCEDURE — 84481 FREE ASSAY (FT-3): CPT

## 2024-09-13 PROCEDURE — 84443 ASSAY THYROID STIM HORMONE: CPT

## 2024-09-13 PROCEDURE — 36415 COLL VENOUS BLD VENIPUNCTURE: CPT

## 2024-09-16 NOTE — PROGRESS NOTES
HPI   50 yo presents in follow up for graves disease(6/24 methimazole started) and dm2.  A1c-6.3% today.    Dm2:  Pt with dm2 (3/ 2024:A1c-8%).  -had gi upset on metformin 500mg ir   -ozempic 1 mg weekly and tolerating  -pt nervous to test sugars, uses olivia intermittently     Thyroid:  TSH-<0.01, ft4-3.5 march 2024,   -in 2023 tsh 0.01-0.02, ft4-2-3.1 range  -June 2024 tsh<0.01, ft4-2.9/ft3-8.5, tpo+, tsi-, tsh rec ab+ (10mg methimazole added)     Sx:  -no obstructive sx  -no eye sx  -pt feeling euthyroid, no more tachycardia          Current Outpatient Medications:     ascorbic acid (Vitamin C) 500 mg tablet, Take 1 tablet (500 mg) by mouth once daily., Disp: , Rfl:     b complex 0.4 mg tablet, Take by mouth., Disp: , Rfl:     cholecalciferol (Vitamin D3) 50 mcg (2,000 unit) capsule, Take 1 capsule (50 mcg) by mouth early in the morning.., Disp: , Rfl:     FreeStyle Olivia 3 Sensor device, Apply every 2 weeks, Disp: 2 each, Rfl: 11    L. acidophilus/Bifid. animalis (DAILY PROBIOTIC ORAL), Take by mouth., Disp: , Rfl:     metFORMIN (Glucophage) 500 mg tablet, Take 1 tablet (500 mg) by mouth 2 times a day with meals. (Patient not taking: Reported on 6/12/2024), Disp: 180 tablet, Rfl: 3    methIMAzole (Tapazole) 10 mg tablet, As directed take one tablet daily, Disp: 30 tablet, Rfl: 2    multivit-min/iron/folic acid/K (ADULTS MULTIVITAMIN ORAL), Take 1 tablet by mouth once daily., Disp: , Rfl:     semaglutide (Ozempic) 0.25 mg or 0.5 mg (2 mg/3 mL) pen injector, Inject 0.5 mg under the skin every 7 days., Disp: 3 mL, Rfl: 5    semaglutide (Ozempic) 1 mg/dose (4 mg/3 mL) pen injector, Inject 1 mg under the skin 1 (one) time per week., Disp: 3 mL, Rfl: 5    zinc gluconate 100 mg tablet, Take 1 tablet (100 mg) by mouth once daily., Disp: , Rfl:       Allergies as of 09/17/2024    (No Known Allergies)         Review of Systems   Cardiology: Lightheadedness-denies.  Chest pain-denies.  Leg edema-denies.   "Palpitations-denies.  Respiratory: Cough-denies. Shortness of breath-denies.  Wheezing-denies.  Gastroenterology: Constipation-denies.  Diarrhea-denies.  Heartburn-at times.  Endocrinology: Cold intolerance-denies.  Heat intolerance-denies.  Sweats-denies.  Neurology: Headache-denies.  Tremor-denies.  Neuropathy in extremities-denies.  Psychology: Low energy-denies.  Irritability-denies.  Sleep disturbances-denies.      /78 (BP Location: Left arm, Patient Position: Sitting, BP Cuff Size: Adult)   Pulse 89   Wt 64.7 kg (142 lb 9.6 oz)   LMP  (LMP Unknown)   BMI 27.85 kg/m²       Labs:  Lab Results   Component Value Date    WBC 6.8 03/08/2023    NRBC 0 03/08/2023    RBC 4.62 03/08/2023    HGB 13.6 03/08/2023    HCT 41.1 03/08/2023     03/08/2023     Lab Results   Component Value Date    CALCIUM 9.6 03/08/2024    AST 33 03/08/2024    ALKPHOS 77 03/08/2024    BILITOT 0.5 03/08/2024    PROT 6.7 03/08/2024    ALBUMIN 4.1 03/08/2024    GLOB 2.5 03/08/2023    AGR 1.7 03/08/2023     03/08/2024    K 4.3 03/08/2024     03/08/2024    CO2 21 (L) 03/08/2024    ANIONGAP 18 03/08/2024    BUN 12 03/08/2024    CREATININE 0.50 03/08/2024    UREACREAUR 32.5 (H) 06/16/2023    GLUCOSE 98 03/08/2024    ALT 34 03/08/2024    EGFR >90 03/08/2024     Lab Results   Component Value Date    CHOL 170 03/08/2024    TRIG 60 03/08/2024    HDL 68.0 03/08/2024    LDLCALC 90 03/08/2024     No results found for: \"MICROALBCREA\"  Lab Results   Component Value Date    TSH 0.01 (L) 09/13/2024     No results found for: \"[id number]\"  Lab Results   Component Value Date    HGBA1C 6.3 09/17/2024         Physical Exam   General Appearance: pleasant, cooperative, no acute distress  HEENT: no chemosis, no proptosis, no lid lag, no lid retraction  Neck: no lymphadenopathy, no thyromegaly, no dominant thyroid nodules  Heart: no murmurs, regular rate and rhythm, S1 and S2  Lungs: no wheezes, no rhonci, no rales  Extremities: no lower " extremity swelling      Assessment/Plan   1. Type 2 diabetes mellitus without complication, without long-term current use of insulin (Multi)  -A1c ordered and reviewed  -labs/glycemic log reviewed    -overall doing well, low threshold to increase to 2 mg dose if A1c or wt going in wrong direction  -screen for lipids prior to next visit    2. Hyperthyroidism  -lower methimazole from 10 to 5 mg  -repeat labs prior to next visit  -will try to wean over the next 1-1.5yrs         Follow Up:  Ana 4 months, repeat labs prior to next visit    -labs/tests/notes reviewed  -reviewed and counseled patient on medication monitoring and side effects

## 2024-09-17 ENCOUNTER — APPOINTMENT (OUTPATIENT)
Dept: ENDOCRINOLOGY | Facility: CLINIC | Age: 49
End: 2024-09-17
Payer: COMMERCIAL

## 2024-09-17 VITALS
BODY MASS INDEX: 27.85 KG/M2 | DIASTOLIC BLOOD PRESSURE: 78 MMHG | SYSTOLIC BLOOD PRESSURE: 113 MMHG | WEIGHT: 142.6 LBS | HEART RATE: 89 BPM

## 2024-09-17 DIAGNOSIS — E05.90 HYPERTHYROIDISM: ICD-10-CM

## 2024-09-17 DIAGNOSIS — E11.9 TYPE 2 DIABETES MELLITUS WITHOUT COMPLICATION, WITHOUT LONG-TERM CURRENT USE OF INSULIN (MULTI): Primary | ICD-10-CM

## 2024-09-17 LAB — POC HEMOGLOBIN A1C: 6.3 % (ref 4.2–6.5)

## 2024-09-17 PROCEDURE — 3078F DIAST BP <80 MM HG: CPT | Performed by: INTERNAL MEDICINE

## 2024-09-17 PROCEDURE — 3048F LDL-C <100 MG/DL: CPT | Performed by: INTERNAL MEDICINE

## 2024-09-17 PROCEDURE — 1036F TOBACCO NON-USER: CPT | Performed by: INTERNAL MEDICINE

## 2024-09-17 PROCEDURE — 99214 OFFICE O/P EST MOD 30 MIN: CPT | Performed by: INTERNAL MEDICINE

## 2024-09-17 PROCEDURE — 3074F SYST BP LT 130 MM HG: CPT | Performed by: INTERNAL MEDICINE

## 2024-09-17 PROCEDURE — 3052F HG A1C>EQUAL 8.0%<EQUAL 9.0%: CPT | Performed by: INTERNAL MEDICINE

## 2024-09-17 PROCEDURE — 83036 HEMOGLOBIN GLYCOSYLATED A1C: CPT | Performed by: INTERNAL MEDICINE

## 2024-09-17 RX ORDER — METHIMAZOLE 5 MG/1
5 TABLET ORAL DAILY
Qty: 90 TABLET | Refills: 1 | Status: SHIPPED | OUTPATIENT
Start: 2024-09-17

## 2024-09-17 RX ORDER — METHIMAZOLE 5 MG/1
5 TABLET ORAL DAILY
Qty: 30 TABLET | Refills: 6 | Status: SHIPPED | OUTPATIENT
Start: 2024-09-17 | End: 2024-09-17

## 2024-09-17 ASSESSMENT — PAIN SCALES - GENERAL: PAINLEVEL: 0-NO PAIN

## 2024-09-17 ASSESSMENT — ENCOUNTER SYMPTOMS: DEPRESSION: 0

## 2024-10-14 PROCEDURE — RXMED WILLOW AMBULATORY MEDICATION CHARGE

## 2024-10-17 ENCOUNTER — PHARMACY VISIT (OUTPATIENT)
Dept: PHARMACY | Facility: CLINIC | Age: 49
End: 2024-10-17
Payer: COMMERCIAL

## 2024-10-22 ENCOUNTER — PATIENT MESSAGE (OUTPATIENT)
Dept: ENDOCRINOLOGY | Facility: CLINIC | Age: 49
End: 2024-10-22
Payer: COMMERCIAL

## 2024-10-22 DIAGNOSIS — E11.9 TYPE 2 DIABETES MELLITUS WITHOUT COMPLICATION, WITHOUT LONG-TERM CURRENT USE OF INSULIN (MULTI): Primary | ICD-10-CM

## 2024-10-22 RX ORDER — SEMAGLUTIDE 2.68 MG/ML
2 INJECTION, SOLUTION SUBCUTANEOUS
Qty: 9 ML | Refills: 1 | Status: SHIPPED | OUTPATIENT
Start: 2024-10-22

## 2024-11-07 PROCEDURE — RXMED WILLOW AMBULATORY MEDICATION CHARGE

## 2024-11-09 ENCOUNTER — PHARMACY VISIT (OUTPATIENT)
Dept: PHARMACY | Facility: CLINIC | Age: 49
End: 2024-11-09
Payer: COMMERCIAL

## 2024-12-09 PROCEDURE — RXMED WILLOW AMBULATORY MEDICATION CHARGE

## 2024-12-13 ENCOUNTER — PHARMACY VISIT (OUTPATIENT)
Dept: PHARMACY | Facility: CLINIC | Age: 49
End: 2024-12-13
Payer: COMMERCIAL

## 2025-01-03 PROCEDURE — RXMED WILLOW AMBULATORY MEDICATION CHARGE

## 2025-01-10 ENCOUNTER — PHARMACY VISIT (OUTPATIENT)
Dept: PHARMACY | Facility: CLINIC | Age: 50
End: 2025-01-10
Payer: COMMERCIAL

## 2025-01-17 ENCOUNTER — LAB (OUTPATIENT)
Dept: LAB | Facility: LAB | Age: 50
End: 2025-01-17
Payer: COMMERCIAL

## 2025-01-17 DIAGNOSIS — E05.90 HYPERTHYROIDISM: ICD-10-CM

## 2025-01-17 DIAGNOSIS — E11.9 TYPE 2 DIABETES MELLITUS WITHOUT COMPLICATION, WITHOUT LONG-TERM CURRENT USE OF INSULIN (MULTI): ICD-10-CM

## 2025-01-17 LAB
ALBUMIN SERPL BCP-MCNC: 4.4 G/DL (ref 3.4–5)
ALP SERPL-CCNC: 60 U/L (ref 33–110)
ALT SERPL W P-5'-P-CCNC: 23 U/L (ref 7–45)
ANION GAP SERPL CALCULATED.3IONS-SCNC: 11 MMOL/L (ref 10–20)
AST SERPL W P-5'-P-CCNC: 22 U/L (ref 9–39)
BASOPHILS # BLD AUTO: 0.05 X10*3/UL (ref 0–0.1)
BASOPHILS NFR BLD AUTO: 0.7 %
BILIRUB SERPL-MCNC: 0.4 MG/DL (ref 0–1.2)
BUN SERPL-MCNC: 19 MG/DL (ref 6–23)
CALCIUM SERPL-MCNC: 9.1 MG/DL (ref 8.6–10.3)
CHLORIDE SERPL-SCNC: 103 MMOL/L (ref 98–107)
CHOLEST SERPL-MCNC: 225 MG/DL (ref 0–199)
CHOLEST/HDLC SERPL: 2.9 {RATIO}
CO2 SERPL-SCNC: 28 MMOL/L (ref 21–32)
CREAT SERPL-MCNC: 0.76 MG/DL (ref 0.5–1.05)
CREAT UR-MCNC: 22.6 MG/DL (ref 20–320)
EGFRCR SERPLBLD CKD-EPI 2021: >90 ML/MIN/1.73M*2
EOSINOPHIL # BLD AUTO: 0.17 X10*3/UL (ref 0–0.7)
EOSINOPHIL NFR BLD AUTO: 2.2 %
ERYTHROCYTE [DISTWIDTH] IN BLOOD BY AUTOMATED COUNT: 13 % (ref 11.5–14.5)
GLUCOSE SERPL-MCNC: 100 MG/DL (ref 74–99)
HCT VFR BLD AUTO: 43.3 % (ref 36–46)
HDLC SERPL-MCNC: 78.1 MG/DL
HGB BLD-MCNC: 14.2 G/DL (ref 12–16)
IMM GRANULOCYTES # BLD AUTO: 0.01 X10*3/UL (ref 0–0.7)
IMM GRANULOCYTES NFR BLD AUTO: 0.1 % (ref 0–0.9)
LDLC SERPL CALC-MCNC: 138 MG/DL
LYMPHOCYTES # BLD AUTO: 3.06 X10*3/UL (ref 1.2–4.8)
LYMPHOCYTES NFR BLD AUTO: 40.4 %
MCH RBC QN AUTO: 28.7 PG (ref 26–34)
MCHC RBC AUTO-ENTMCNC: 32.8 G/DL (ref 32–36)
MCV RBC AUTO: 88 FL (ref 80–100)
MICROALBUMIN UR-MCNC: <7 MG/L
MICROALBUMIN/CREAT UR: NORMAL MG/G{CREAT}
MONOCYTES # BLD AUTO: 0.47 X10*3/UL (ref 0.1–1)
MONOCYTES NFR BLD AUTO: 6.2 %
NEUTROPHILS # BLD AUTO: 3.81 X10*3/UL (ref 1.2–7.7)
NEUTROPHILS NFR BLD AUTO: 50.4 %
NON HDL CHOLESTEROL: 147 MG/DL (ref 0–149)
NRBC BLD-RTO: 0 /100 WBCS (ref 0–0)
PLATELET # BLD AUTO: 273 X10*3/UL (ref 150–450)
POTASSIUM SERPL-SCNC: 4.5 MMOL/L (ref 3.5–5.3)
PROT SERPL-MCNC: 6.9 G/DL (ref 6.4–8.2)
RBC # BLD AUTO: 4.95 X10*6/UL (ref 4–5.2)
SODIUM SERPL-SCNC: 137 MMOL/L (ref 136–145)
T3FREE SERPL-MCNC: 2.5 PG/ML (ref 2.3–4.2)
T4 FREE SERPL-MCNC: 0.59 NG/DL (ref 0.61–1.12)
T4 FREE SERPL-MCNC: 0.71 NG/DL (ref 0.61–1.12)
TRIGL SERPL-MCNC: 45 MG/DL (ref 0–149)
TSH SERPL-ACNC: 0.14 MIU/L (ref 0.44–3.98)
VLDL: 9 MG/DL (ref 0–40)
WBC # BLD AUTO: 7.6 X10*3/UL (ref 4.4–11.3)

## 2025-01-17 PROCEDURE — 80061 LIPID PANEL: CPT

## 2025-01-17 PROCEDURE — 82570 ASSAY OF URINE CREATININE: CPT

## 2025-01-17 PROCEDURE — 84443 ASSAY THYROID STIM HORMONE: CPT

## 2025-01-17 PROCEDURE — 80053 COMPREHEN METABOLIC PANEL: CPT

## 2025-01-17 PROCEDURE — 85025 COMPLETE CBC W/AUTO DIFF WBC: CPT

## 2025-01-17 PROCEDURE — 84439 ASSAY OF FREE THYROXINE: CPT

## 2025-01-17 PROCEDURE — 84481 FREE ASSAY (FT-3): CPT

## 2025-01-17 PROCEDURE — 82043 UR ALBUMIN QUANTITATIVE: CPT

## 2025-01-20 NOTE — PROGRESS NOTES
HPI   50 yo presents in follow up for graves disease(6/24 methimazole started) and dm2.  A1c-6.3% last visit, today 6.2%.     Dm2:  Pt with dm2 (3/ 2024:A1c-8%).  -had gi upset on metformin 500mg ir   -ozempic 2 mg weekly and tolerating (increased last visit)  -pt nervous to test sugars, uses olivia intermittently     Thyroid:  TSH-<0.01, ft4-3.5 march 2024,   -in 2023 tsh 0.01-0.02, ft4-2-3.1 range  -June 2024 tsh<0.01, ft4-2.9/ft3-8.5, tpo+, tsi-, tsh rec ab+ (10mg methimazole added), dose lowered to 5mg sept 2024     Sx:  -no obstructive sx  -no eye sx  -pt feeling euthyroid, no more tachycardia         Current Outpatient Medications:     ascorbic acid (Vitamin C) 500 mg tablet, Take 1 tablet (500 mg) by mouth once daily., Disp: , Rfl:     b complex 0.4 mg tablet, Take by mouth., Disp: , Rfl:     cholecalciferol (Vitamin D3) 50 mcg (2,000 unit) capsule, Take 1 capsule (50 mcg) by mouth early in the morning.., Disp: , Rfl:     FreeStyle Olivia 3 Sensor device, Apply every 2 weeks, Disp: 2 each, Rfl: 11    L. acidophilus/Bifid. animalis (DAILY PROBIOTIC ORAL), Take by mouth., Disp: , Rfl:     methIMAzole (Tapazole) 5 mg tablet, TAKE 1 TABLET(5 MG) BY MOUTH DAILY, Disp: 90 tablet, Rfl: 1    multivit-min/iron/folic acid/K (ADULTS MULTIVITAMIN ORAL), Take 1 tablet by mouth once daily., Disp: , Rfl:     semaglutide (Ozempic) 1 mg/dose (4 mg/3 mL) pen injector, Inject 1 mg under the skin 1 (one) time per week., Disp: 3 mL, Rfl: 5    semaglutide (Ozempic) 2 mg/dose (8 mg/3 mL) pen injector, Inject 2 mg under the skin 1 (one) time per week., Disp: 9 mL, Rfl: 1    zinc gluconate 100 mg tablet, Take 1 tablet (100 mg) by mouth once daily., Disp: , Rfl:       Allergies as of 01/21/2025    (No Known Allergies)         Review of Systems   Cardiology: Lightheadedness-denies.  Chest pain-denies.  Leg edema-denies.  Palpitations-denies.  Respiratory: Cough-denies. Shortness of breath-denies.  Wheezing-denies.  Gastroenterology:  "Constipation-denies.  Diarrhea-denies.  Heartburn-denies.  Endocrinology: Cold intolerance-denies.  Heat intolerance-denies.  Sweats-denies.  Neurology: Headache-denies.  Tremor-denies.  Neuropathy in extremities-denies.  Psychology: Low energy-denies.  Irritability-denies.  Sleep disturbances-denies.      /79 (BP Location: Right arm, Patient Position: Sitting, BP Cuff Size: Adult)   Pulse 71   Wt 68.4 kg (150 lb 12.8 oz)   LMP  (LMP Unknown)   BMI 29.45 kg/m²       Labs:  Lab Results   Component Value Date    WBC 7.6 01/17/2025    NRBC 0.0 01/17/2025    RBC 4.95 01/17/2025    HGB 14.2 01/17/2025    HCT 43.3 01/17/2025     01/17/2025     Lab Results   Component Value Date    CALCIUM 9.1 01/17/2025    AST 22 01/17/2025    ALKPHOS 60 01/17/2025    BILITOT 0.4 01/17/2025    PROT 6.9 01/17/2025    ALBUMIN 4.4 01/17/2025    GLOB 2.5 03/08/2023    AGR 1.7 03/08/2023     01/17/2025    K 4.5 01/17/2025     01/17/2025    CO2 28 01/17/2025    ANIONGAP 11 01/17/2025    BUN 19 01/17/2025    CREATININE 0.76 01/17/2025    UREACREAUR 32.5 (H) 06/16/2023    GLUCOSE 100 (H) 01/17/2025    ALT 23 01/17/2025    EGFR >90 01/17/2025     Lab Results   Component Value Date    CHOL 225 (H) 01/17/2025    TRIG 45 01/17/2025    HDL 78.1 01/17/2025    LDLCALC 138 (H) 01/17/2025     Lab Results   Component Value Date    MICROALBCREA  01/17/2025      Comment:      One or more analytes used in this calculation is outside of the analytical measurement range. Calculation cannot be performed.     Lab Results   Component Value Date    TSH 0.14 (L) 01/17/2025     No results found for: \"[id number]\"  Lab Results   Component Value Date    HGBA1C 6.3 09/17/2024         Physical Exam   General Appearance: pleasant, cooperative, no acute distress  HEENT: no chemosis, no proptosis, no lid lag, no lid retraction  Neck: no lymphadenopathy, no thyromegaly, no dominant thyroid nodules  Heart: no murmurs, regular rate and rhythm, S1 " and S2  Lungs: no wheezes, no rhonci, no rales  Extremities: no lower extremity swelling      Assessment/Plan   1. Type 2 diabetes mellitus without complication, without long-term current use of insulin (Multi) (Primary)  -A1c ordered and reviewed  -glycemic log reviewed  -labs reviewed    -pt concerned no longer losing and in fact gained wt since last visit  -change ozempic 2mg to mounjaro 5mg weekly (reviewed risk/benefits/warnings/how to inject)  -can message office monthly if sugars/wt are going in the wrong direction    2. Hyperthyroidism  -continue 5mg methimazole, repeat labs prior to 6 month follow up  -will consider coming off/weaning therapy at that visit     3. Suggest statin for primary prevention, explaining the indication/need  -pt aware of risks/benefits and would rather not use    Follow Up:  Ana 6 months    -labs/tests/notes reviewed  -reviewed and counseled patient on medication monitoring and side effects

## 2025-01-21 ENCOUNTER — APPOINTMENT (OUTPATIENT)
Dept: ENDOCRINOLOGY | Facility: CLINIC | Age: 50
End: 2025-01-21
Payer: COMMERCIAL

## 2025-01-21 ENCOUNTER — PHARMACY VISIT (OUTPATIENT)
Dept: PHARMACY | Facility: CLINIC | Age: 50
End: 2025-01-21
Payer: COMMERCIAL

## 2025-01-21 VITALS
BODY MASS INDEX: 29.45 KG/M2 | WEIGHT: 150.8 LBS | DIASTOLIC BLOOD PRESSURE: 79 MMHG | HEART RATE: 71 BPM | SYSTOLIC BLOOD PRESSURE: 126 MMHG

## 2025-01-21 DIAGNOSIS — E05.90 HYPERTHYROIDISM: ICD-10-CM

## 2025-01-21 DIAGNOSIS — E11.9 TYPE 2 DIABETES MELLITUS WITHOUT COMPLICATION, WITHOUT LONG-TERM CURRENT USE OF INSULIN (MULTI): Primary | ICD-10-CM

## 2025-01-21 LAB — POC HEMOGLOBIN A1C: 6.2 % (ref 4.2–6.5)

## 2025-01-21 PROCEDURE — 3062F POS MACROALBUMINURIA REV: CPT | Performed by: INTERNAL MEDICINE

## 2025-01-21 PROCEDURE — RXMED WILLOW AMBULATORY MEDICATION CHARGE

## 2025-01-21 PROCEDURE — 99214 OFFICE O/P EST MOD 30 MIN: CPT | Performed by: INTERNAL MEDICINE

## 2025-01-21 PROCEDURE — 3074F SYST BP LT 130 MM HG: CPT | Performed by: INTERNAL MEDICINE

## 2025-01-21 PROCEDURE — 3078F DIAST BP <80 MM HG: CPT | Performed by: INTERNAL MEDICINE

## 2025-01-21 PROCEDURE — 1036F TOBACCO NON-USER: CPT | Performed by: INTERNAL MEDICINE

## 2025-01-21 PROCEDURE — 3050F LDL-C >= 130 MG/DL: CPT | Performed by: INTERNAL MEDICINE

## 2025-01-21 PROCEDURE — 83036 HEMOGLOBIN GLYCOSYLATED A1C: CPT | Performed by: INTERNAL MEDICINE

## 2025-01-21 RX ORDER — TIRZEPATIDE 5 MG/.5ML
5 INJECTION, SOLUTION SUBCUTANEOUS
Qty: 2 ML | Refills: 5 | Status: SHIPPED | OUTPATIENT
Start: 2025-01-21

## 2025-01-21 ASSESSMENT — ENCOUNTER SYMPTOMS: DEPRESSION: 0

## 2025-01-21 ASSESSMENT — PAIN SCALES - GENERAL: PAINLEVEL_OUTOF10: 0-NO PAIN

## 2025-02-10 ENCOUNTER — PATIENT MESSAGE (OUTPATIENT)
Dept: ENDOCRINOLOGY | Facility: CLINIC | Age: 50
End: 2025-02-10
Payer: COMMERCIAL

## 2025-02-10 DIAGNOSIS — E11.9 TYPE 2 DIABETES MELLITUS WITHOUT COMPLICATION, WITHOUT LONG-TERM CURRENT USE OF INSULIN (MULTI): Primary | ICD-10-CM

## 2025-02-10 PROCEDURE — RXMED WILLOW AMBULATORY MEDICATION CHARGE

## 2025-02-10 RX ORDER — TIRZEPATIDE 7.5 MG/.5ML
7.5 INJECTION, SOLUTION SUBCUTANEOUS
Qty: 6 ML | Refills: 2 | Status: SHIPPED | OUTPATIENT
Start: 2025-02-10

## 2025-02-13 ENCOUNTER — PHARMACY VISIT (OUTPATIENT)
Dept: PHARMACY | Facility: CLINIC | Age: 50
End: 2025-02-13
Payer: COMMERCIAL

## 2025-03-08 PROCEDURE — RXMED WILLOW AMBULATORY MEDICATION CHARGE

## 2025-03-15 ENCOUNTER — PHARMACY VISIT (OUTPATIENT)
Dept: PHARMACY | Facility: CLINIC | Age: 50
End: 2025-03-15
Payer: COMMERCIAL

## 2025-03-24 ENCOUNTER — PATIENT MESSAGE (OUTPATIENT)
Dept: PRIMARY CARE | Facility: CLINIC | Age: 50
End: 2025-03-24
Payer: COMMERCIAL

## 2025-03-24 DIAGNOSIS — L25.9 CONTACT DERMATITIS, UNSPECIFIED CONTACT DERMATITIS TYPE, UNSPECIFIED TRIGGER: Primary | ICD-10-CM

## 2025-03-25 RX ORDER — TRIAMCINOLONE ACETONIDE 1 MG/G
1 OINTMENT TOPICAL 2 TIMES DAILY
Qty: 80 G | Refills: 1 | Status: SHIPPED | OUTPATIENT
Start: 2025-03-25

## 2025-03-25 RX ORDER — TRIAMCINOLONE ACETONIDE 1 MG/G
1 OINTMENT TOPICAL 2 TIMES DAILY
COMMUNITY
End: 2025-03-25 | Stop reason: SDUPTHER

## 2025-04-01 ENCOUNTER — PATIENT MESSAGE (OUTPATIENT)
Dept: ENDOCRINOLOGY | Facility: CLINIC | Age: 50
End: 2025-04-01
Payer: COMMERCIAL

## 2025-04-01 DIAGNOSIS — E11.9 TYPE 2 DIABETES MELLITUS WITHOUT COMPLICATION, WITHOUT LONG-TERM CURRENT USE OF INSULIN: Primary | ICD-10-CM

## 2025-04-02 ENCOUNTER — TELEPHONE (OUTPATIENT)
Dept: ENDOCRINOLOGY | Facility: CLINIC | Age: 50
End: 2025-04-02
Payer: COMMERCIAL

## 2025-04-02 RX ORDER — TIRZEPATIDE 10 MG/.5ML
10 INJECTION, SOLUTION SUBCUTANEOUS
Qty: 2 ML | Refills: 5 | Status: SHIPPED | OUTPATIENT
Start: 2025-04-06

## 2025-04-02 NOTE — TELEPHONE ENCOUNTER
Called and spoke with pharmacist at [facility] to inform them that the patient has taken Mounjaro 5 mg and 7.5 mg previously to the 10 mg. Verbal Ok per Dr. Perez

## 2025-06-19 ENCOUNTER — PATIENT MESSAGE (OUTPATIENT)
Facility: CLINIC | Age: 50
End: 2025-06-19
Payer: COMMERCIAL

## 2025-06-19 DIAGNOSIS — E11.9 TYPE 2 DIABETES MELLITUS WITHOUT COMPLICATION, WITHOUT LONG-TERM CURRENT USE OF INSULIN: Primary | ICD-10-CM

## 2025-06-19 RX ORDER — TIRZEPATIDE 12.5 MG/.5ML
12.5 INJECTION, SOLUTION SUBCUTANEOUS
Qty: 2 ML | Refills: 5 | Status: SHIPPED | OUTPATIENT
Start: 2025-06-22

## 2025-07-17 LAB
CHOLEST SERPL-MCNC: 201 MG/DL
CHOLEST/HDLC SERPL: 2.7 (CALC)
HDLC SERPL-MCNC: 75 MG/DL
LDLC SERPL CALC-MCNC: 112 MG/DL (CALC)
NONHDLC SERPL-MCNC: 126 MG/DL (CALC)
T3FREE SERPL-MCNC: 2.6 PG/ML (ref 2.3–4.2)
T4 FREE SERPL-MCNC: 1.2 NG/DL (ref 0.8–1.8)
TRIGL SERPL-MCNC: 53 MG/DL
TSH BII SER-ACNC: <1 IU/L
TSH SERPL-ACNC: 2.81 MIU/L
TSI SER-ACNC: 105 % BASELINE

## 2025-07-22 ENCOUNTER — APPOINTMENT (OUTPATIENT)
Dept: ENDOCRINOLOGY | Facility: CLINIC | Age: 50
End: 2025-07-22
Payer: COMMERCIAL

## 2025-07-22 VITALS
DIASTOLIC BLOOD PRESSURE: 70 MMHG | WEIGHT: 121.8 LBS | SYSTOLIC BLOOD PRESSURE: 114 MMHG | HEART RATE: 74 BPM | BODY MASS INDEX: 23.79 KG/M2

## 2025-07-22 DIAGNOSIS — E11.9 TYPE 2 DIABETES MELLITUS WITHOUT COMPLICATION, WITHOUT LONG-TERM CURRENT USE OF INSULIN: Primary | ICD-10-CM

## 2025-07-22 DIAGNOSIS — E05.90 HYPERTHYROIDISM: ICD-10-CM

## 2025-07-22 LAB — POC HEMOGLOBIN A1C: 5.9 % (ref 4.2–6.5)

## 2025-07-22 PROCEDURE — RXMED WILLOW AMBULATORY MEDICATION CHARGE

## 2025-07-22 PROCEDURE — 83036 HEMOGLOBIN GLYCOSYLATED A1C: CPT | Mod: QW | Performed by: INTERNAL MEDICINE

## 2025-07-22 PROCEDURE — 3078F DIAST BP <80 MM HG: CPT | Performed by: INTERNAL MEDICINE

## 2025-07-22 PROCEDURE — 3074F SYST BP LT 130 MM HG: CPT | Performed by: INTERNAL MEDICINE

## 2025-07-22 PROCEDURE — 3044F HG A1C LEVEL LT 7.0%: CPT | Performed by: INTERNAL MEDICINE

## 2025-07-22 PROCEDURE — 99214 OFFICE O/P EST MOD 30 MIN: CPT | Performed by: INTERNAL MEDICINE

## 2025-07-22 PROCEDURE — 1036F TOBACCO NON-USER: CPT | Performed by: INTERNAL MEDICINE

## 2025-07-22 RX ORDER — TIRZEPATIDE 12.5 MG/.5ML
12.5 INJECTION, SOLUTION SUBCUTANEOUS
Qty: 2 ML | Refills: 11 | Status: SHIPPED | OUTPATIENT
Start: 2025-07-22

## 2025-07-22 ASSESSMENT — PAIN SCALES - GENERAL: PAINLEVEL_OUTOF10: 0-NO PAIN

## 2025-07-22 ASSESSMENT — ENCOUNTER SYMPTOMS
LOSS OF SENSATION IN FEET: 0
DEPRESSION: 0

## 2025-07-24 ENCOUNTER — PHARMACY VISIT (OUTPATIENT)
Dept: PHARMACY | Facility: CLINIC | Age: 50
End: 2025-07-24
Payer: COMMERCIAL

## 2025-08-14 PROCEDURE — RXMED WILLOW AMBULATORY MEDICATION CHARGE

## 2025-08-16 ENCOUNTER — PHARMACY VISIT (OUTPATIENT)
Dept: PHARMACY | Facility: CLINIC | Age: 50
End: 2025-08-16
Payer: COMMERCIAL

## 2026-01-06 ENCOUNTER — APPOINTMENT (OUTPATIENT)
Facility: CLINIC | Age: 51
End: 2026-01-06
Payer: COMMERCIAL